# Patient Record
(demographics unavailable — no encounter records)

---

## 2025-01-12 NOTE — ASSESSMENT
[FreeTextEntry1] :  70-year-old with history of elevated PSA and a PI-RADS 4 lesion on MRI. History of MRI guided biopsy, no cancer identified.  Also has history of gross hematuria and status post CT scan and cystoscopy which was negative for bladder tumor. Multiple bilateral Bosniak 1 renal cyst.  Recent urine testing negative for blood.  He states he has not seen gross hematuria since workup.  Currently maintained on finasteride 5 mg daily for BPH. PSA Jan 2025  4.13 ng/ml (corrects to 8) PSA May 2024 4.54 ng/mL.(corrects to 9) PSA May 2023 was 6.1 ng/mL.  Prior to finasteride.  Patient reports feeling well.  Nocturia 3 times a night which is stable.  Plan  70-year-old with elevated PSA.  Abnormal MRI status post MRI guided fusion biopsy which was negative for cancer. History of gross hematuria workup.  Most recent UA shows no blood.  Currently on finasteride 5 mg daily. Recent PSA reviewed with patient.  -Follow-up 6 months with repeat PSA and US - all questions answered

## 2025-01-12 NOTE — LETTER BODY
[Dear  ___] : Dear  [unfilled], [Consult Letter:] : I had the pleasure of evaluating your patient, [unfilled]. [Please see my note below.] : Please see my note below. [Sincerely,] : Sincerely, [FreeTextEntry3] : Huyen Arreaga MD, FACS

## 2025-01-12 NOTE — HISTORY OF PRESENT ILLNESS
[FreeTextEntry1] :  70-year-old with history of elevated PSA and a PI-RADS 4 lesion on MRI. History of MRI guided biopsy, no cancer identified.  Also has history of gross hematuria and status post CT scan and cystoscopy which was negative for bladder tumor. Multiple bilateral Bosniak 1 renal cyst.  Recent urine testing negative for blood.  He states he has not seen gross hematuria since workup.  Currently maintained on finasteride 5 mg daily for BPH. PSA Jan 2025  4.13 ng/ml (corrects to 8) PSA May 2024 4.54 ng/mL.(corrects to 9) PSA May 2023 was 6.1 ng/mL.  Prior to finasteride.  Patient reports feeling well.  Nocturia 3 times a night which is stable.  PVR 8 ml  Prior: He is a prior patient of Dr. Brooks and does have history of urinary retention. MRI of prostate in July 2023 revealed a 93 g prostate. [Urinary Frequency] : urinary frequency [Nocturia] : nocturia [Straining] : straining [Weak Stream] : weak stream [Intermittency] : intermittency [Erectile Dysfunction] : Erectile Dysfunction

## 2025-07-10 NOTE — HISTORY OF PRESENT ILLNESS
[Erectile Dysfunction] : Erectile Dysfunction [FreeTextEntry1] :  71-year-old with history of elevated PSA and a PI-RADS 4 lesion on MRI. History of MRI guided biopsy, no cancer identified. (11/2023) history of gross hematuria and status post CT scan and cystoscopy which was negative for bladder tumor. Multiple bilateral Bosniak 1 renal cyst.  Currently maintained on finasteride 5 mg daily for BPH. PSA drawn today PSA Jan 2025  4.13 ng/ml (corrects to 8) PSA May 2024 4.54 ng/mL.(corrects to 9) PSA May 2023 was 6.1 ng/mL.  Prior to finasteride.  Patient reports feeling well.  denies bothersome LUTS   Renal and Bladder US 4/2025 FINDINGS: Right kidney: 12.0 cm. No hydronephrosis or calculi. Renal cysts measuring up to 2.6 cm. Left kidney: 12.1 cm. No hydronephrosis or calculi. Lower pole cyst measuring 1.3 cm. Urinary bladder: Bilateral ureteral jets are visualized. Prevoid volume measures 239.5 cc. Post void measures 0 cc. Urinary bladder wall measures 0.3 cm. Prostate: Measures 79.6 cc. IMPRESSION: No hydronephrosis or renal stone. Prostatomegaly.  CT scan 10/2023 IMPRESSION: Multiple, bilateral Bosniak 1 renal cysts as described above. No filling defects within the calyces, collecting system or ureters bilaterally. Prostatomegaly. The enlarged prostate indents on the posterior-inferior urinary bladder.  MRI prostate - [facility] 7/2023 PIRADS 4 93 gram prostate   PVR 29 ml   [Urinary Frequency] : no urinary frequency [Nocturia] : no nocturia [Straining] : no straining [Weak Stream] : no weak stream [Intermittency] : no intermittency

## 2025-07-10 NOTE — ASSESSMENT
[FreeTextEntry1] :  71-year-old with history of elevated PSA and a PI-RADS 4 lesion on MRI. History of MRI guided biopsy, no cancer identified. (11/2023) history of gross hematuria and status post CT scan and cystoscopy which was negative for bladder tumor. Multiple bilateral Bosniak 1 renal cyst.  Currently maintained on finasteride 5 mg daily for BPH. PSA drawn today PSA Jan 2025  4.13 ng/ml (corrects to 8) PSA May 2024 4.54 ng/mL.(corrects to 9) PSA May 2023 was 6.1 ng/mL.  Prior to finasteride.  Patient reports feeling well.  denies bothersome LUTS   Renal and Bladder US 4/2025 FINDINGS: Right kidney: 12.0 cm. No hydronephrosis or calculi. Renal cysts measuring up to 2.6 cm. Left kidney: 12.1 cm. No hydronephrosis or calculi. Lower pole cyst measuring 1.3 cm. Urinary bladder: Bilateral ureteral jets are visualized. Prevoid volume measures 239.5 cc. Post void measures 0 cc. Urinary bladder wall measures 0.3 cm. Prostate: Measures 79.6 cc. IMPRESSION: No hydronephrosis or renal stone. Prostatomegaly.  CT scan 10/2023 IMPRESSION: Multiple, bilateral Bosniak 1 renal cysts as described above. No filling defects within the calyces, collecting system or ureters bilaterally. Prostatomegaly. The enlarged prostate indents on the posterior-inferior urinary bladder.  MRI prostate - [facility] 7/2023 PIRADS 4 93 gram prostate   PVR 29 ml  Plan  71-year-old with elevated PSA.  Abnormal MRI status post MRI guided fusion biopsy which was negative for cancer. History of gross hematuria workup.  Most recent UA shows no blood.  Currently on finasteride 5 mg daily. Recent PSA reviewed with patient. and wife (1/2025) - repeated today  - Follow-up 6 months with repeat PSA (will review this from today on Friday) - clotrimazole / betamethasone for groin rash - all questions answered